# Patient Record
Sex: MALE | Race: WHITE
[De-identification: names, ages, dates, MRNs, and addresses within clinical notes are randomized per-mention and may not be internally consistent; named-entity substitution may affect disease eponyms.]

---

## 2019-09-13 ENCOUNTER — HOSPITAL ENCOUNTER (EMERGENCY)
Dept: HOSPITAL 4 - SED | Age: 64
Discharge: HOME | End: 2019-09-13
Payer: MEDICAID

## 2019-09-13 VITALS — SYSTOLIC BLOOD PRESSURE: 130 MMHG

## 2019-09-13 VITALS — BODY MASS INDEX: 37.3 KG/M2 | WEIGHT: 190 LBS | HEIGHT: 60 IN

## 2019-09-13 VITALS — SYSTOLIC BLOOD PRESSURE: 137 MMHG

## 2019-09-13 DIAGNOSIS — I10: ICD-10-CM

## 2019-09-13 DIAGNOSIS — R51: Primary | ICD-10-CM

## 2019-09-13 PROCEDURE — 96372 THER/PROPH/DIAG INJ SC/IM: CPT

## 2019-09-13 PROCEDURE — 99284 EMERGENCY DEPT VISIT MOD MDM: CPT

## 2019-09-13 PROCEDURE — 70450 CT HEAD/BRAIN W/O DYE: CPT

## 2019-09-13 NOTE — NUR
Patient brought to ER for complaint of 8/10 HA and nausea x1 week. Patient 
states pain to frontal head and radiates to occiput. No other symptoms or 
complaints. No report of medicating for headache.

## 2019-09-13 NOTE — NUR
Patient triaged and placed in waiting room. VSS and patient appears in no acute 
distress at this time. Accompanied by WIFE AND SON, awaiting available bed, and 
MD notified of need for MSE.

## 2019-09-13 NOTE — NUR
Patient given written and verbal discharge instructions and verbalizes 
understanding.  ER MD discussed with patient the results and treatment 
provided. Patient in stable condition. ID arm band removed. 

Rx of Flonase and Ibuprofen given. Patient educated on pain management and to 
follow up with PMD. Pain Scale 0/10. Opportunity for questions provided and 
answered. Medication side effect fact sheet provided.